# Patient Record
Sex: MALE | Race: WHITE | URBAN - METROPOLITAN AREA
[De-identification: names, ages, dates, MRNs, and addresses within clinical notes are randomized per-mention and may not be internally consistent; named-entity substitution may affect disease eponyms.]

---

## 2018-01-29 ENCOUNTER — OFFICE VISIT (OUTPATIENT)
Dept: URGENT CARE | Facility: RETAIL CLINIC | Age: 28
End: 2018-01-29
Payer: COMMERCIAL

## 2018-01-29 VITALS
DIASTOLIC BLOOD PRESSURE: 82 MMHG | TEMPERATURE: 99.9 F | HEART RATE: 86 BPM | SYSTOLIC BLOOD PRESSURE: 150 MMHG | OXYGEN SATURATION: 99 %

## 2018-01-29 DIAGNOSIS — J45.909 UNCOMPLICATED ASTHMA, UNSPECIFIED ASTHMA SEVERITY, UNSPECIFIED WHETHER PERSISTENT: ICD-10-CM

## 2018-01-29 DIAGNOSIS — J11.1 INFLUENZA: Primary | ICD-10-CM

## 2018-01-29 DIAGNOSIS — J98.01 ACUTE BRONCHOSPASM: ICD-10-CM

## 2018-01-29 PROCEDURE — 99203 OFFICE O/P NEW LOW 30 MIN: CPT | Performed by: PHYSICIAN ASSISTANT

## 2018-01-29 RX ORDER — ALBUTEROL SULFATE 90 UG/1
1-2 AEROSOL, METERED RESPIRATORY (INHALATION) EVERY 4 HOURS PRN
Qty: 1 INHALER | Refills: 0 | Status: SHIPPED | OUTPATIENT
Start: 2018-01-29

## 2018-01-29 RX ORDER — OSELTAMIVIR PHOSPHATE 75 MG/1
75 CAPSULE ORAL 2 TIMES DAILY
Qty: 10 CAPSULE | Refills: 0 | Status: SHIPPED | OUTPATIENT
Start: 2018-01-29 | End: 2018-02-03

## 2018-01-29 NOTE — MR AVS SNAPSHOT
"              After Visit Summary   1/29/2018    Jeffrey Lopez    MRN: [id number]           Patient Information     Date Of Birth          1990        Visit Information        Provider Department      1/29/2018 5:30 PM Cyndi Wheeler PA-C [facility]        Today's Diagnoses     Influenza    -  1    Acute bronchospasm        Uncomplicated asthma, unspecified asthma severity, unspecified whether persistent          Care Instructions    Start Tamiflu capsule (antiviral medicine) 1 capsule twice a day for 5 days with food  Use albuterol inhaler as needed  Remain out of work/activities until 24 HOURS AFTER FEVER/BODY ACHES have resolved.  Keep mask on when around others, even at home.  Maintain hydration by drinking small amounts of clear fluids frequently. Rest. Vaporizer.  Tylenol or ibuprofen as needed for aches and fever   Take over the counter cough suppressant as needed  Call primary care provider if symptoms worsen, high fever, severe weakness or fainting, increased   Go to the ER if any wheezing or shortness of breath develop.     Discussed importance of receiving flu shot yearly          Follow-ups after your visit        Who to contact     You can reach your care team any time of the day by calling [phone].  Notification of test results:  If you have an abnormal lab result, we will notify you by phone as soon as possible.         Additional Information About Your Visit        MyChart Information     [facility] lets you send messages to your doctor, view your test results, renew your prescriptions, schedule appointments and more. To sign up, go to [email or web address] . Click on \"Log in\" on the left side of the screen, which will take you to the Welcome page. Then click on \"Sign up Now\" on the right side of the page.     You will be asked to enter the access code listed below, as well as some personal information. Please follow the directions to create your username " and password.     Your access code is: [email or web address]  Expires: 2018  6:47 PM     Your access code will  in 90 days. If you need help or a new code, please call your [facility] clinic or [phone].        Care EveryWhere ID     This is your Care EveryWhere ID. This could be used by other organizations to access your [facility] medical records  [id number]        Your Vitals Were     Pulse Temperature Pulse Oximetry             86 99.9  F (37.7  C) (Oral) 99%          Blood Pressure from Last 3 Encounters:   18 150/82    Weight from Last 3 Encounters:   No data found for Wt              Today, you had the following     No orders found for display         Today's Medication Changes          These changes are accurate as of 18  6:47 PM.  If you have any questions, ask your nurse or doctor.               Start taking these medicines.        Dose/Directions    albuterol 108 (90 BASE) MCG/ACT Inhaler   Commonly known as:  PROAIR HFA/PROVENTIL HFA/VENTOLIN HFA   Used for:  Acute bronchospasm        Dose:  1-2 puff   Inhale 1-2 puffs into the lungs every 4 hours as needed For wheezing   Quantity:  1 Inhaler   Refills:  0       oseltamivir 75 MG capsule   Commonly known as:  TAMIFLU   Used for:  Influenza, Uncomplicated asthma, unspecified asthma severity, unspecified whether persistent        Dose:  75 mg   Take 1 capsule (75 mg) by mouth 2 times daily for 5 days   Quantity:  10 capsule   Refills:  0            Where to get your medicines      These medications were sent to [id number] - [place], [place] - [address] [address]  [address] [address] [facility] [address]     Phone:  [phone]     albuterol 108 (90 BASE) MCG/ACT Inhaler    oseltamivir 75 MG capsule                Primary Care Provider Fax #    Provider Not In System [phone]                Equal Access to Services     BRIAN CHAU : Lorrie Andrea, to avitia, zenia zelaya  nidhi benitez ah. So [facility] [phone].    ATENCIÓN: Si habla gurmeet, tiene a fermin disposición servicios gratuitos de asistencia lingüística. Lady al [phone].    We comply with applicable federal civil rights laws and [place] laws. We do not discriminate on the basis of race, color, national origin, age, disability, sex, sexual orientation, or gender identity.            Thank you!     Thank you for choosing [facility]  for your care. Our goal is always to provide you with excellent care. Hearing back from our patients is one way we can continue to improve our services. Please take a few minutes to complete the written survey that you may receive in the mail after your visit with us. Thank you!             Your Updated Medication List - Protect others around you: Learn how to safely use, store and throw away your medicines at www.disposemymeds.org.          This list is accurate as of 1/29/18  6:47 PM.  Always use your most recent med list.                   Brand Name Dispense Instructions for use Diagnosis    albuterol 108 (90 BASE) MCG/ACT Inhaler    PROAIR HFA/PROVENTIL HFA/VENTOLIN HFA    1 Inhaler    Inhale 1-2 puffs into the lungs every 4 hours as needed For wheezing    Acute bronchospasm       oseltamivir 75 MG capsule    TAMIFLU    10 capsule    Take 1 capsule (75 mg) by mouth 2 times daily for 5 days    Influenza, Uncomplicated asthma, unspecified asthma severity, unspecified whether persistent

## 2018-01-30 NOTE — NURSING NOTE
Chief Complaint   Patient presents with     Cough     began yesterday morning     Chills     Sweats     Generalized Body Aches     Headache       Initial /82 (BP Location: Left arm)  Pulse 86  Temp 99.9  F (37.7  C) (Oral)  SpO2 99% There is no height or weight on file to calculate BMI.  Medication Reconciliation: complete

## 2018-01-30 NOTE — PATIENT INSTRUCTIONS
Start Tamiflu capsule (antiviral medicine) 1 capsule twice a day for 5 days with food  Use albuterol inhaler as needed  Remain out of work/activities until 24 HOURS AFTER FEVER/BODY ACHES have resolved.  Keep mask on when around others, even at home.  Maintain hydration by drinking small amounts of clear fluids frequently. Rest. Vaporizer.  Tylenol or ibuprofen as needed for aches and fever   Take over the counter cough suppressant as needed  Call primary care provider if symptoms worsen, high fever, severe weakness or fainting, increased   Go to the ER if any wheezing or shortness of breath develop.     Discussed importance of receiving flu shot yearly

## 2018-01-30 NOTE — PROGRESS NOTES
Chief Complaint   Patient presents with     Cough     began yesterday morning     Chills     Sweats     Generalized Body Aches     Headache      SUBJECTIVE:  Patient presents with flu-like symptoms:  Fevers, chills, sweats,, body aches, and cough started yesterday.  Denies dyspnea  Did not get a flu shot  MIld asthma - some wheezing    No past medical history on file.  No current outpatient prescriptions on file.      No Known Allergies     History   Smoking Status     Not on file   Smokeless Tobacco     Not on file       OBJECITVE;  /82 (BP Location: Left arm)  Pulse 86  Temp 99.9  F (37.7  C) (Oral)  SpO2 99%  Appears moderately ill but not toxic.  EARS:  normal.  THROAT AND PHARYNX:  normal.  NECK: supple; no adenopathy in the neck.  SINUSES: non tender.  CHEST:  intermittent wheezing, otherwise no rhonchi or rales    ASSESSMENT:  (J11.1) Influenza , clinically  (J98.01) Acute bronchospasm  (J45.909) Uncomplicated asthma, unspecified asthma severity, unspecified whether persistent    PLAN:  Plan: oseltamivir (TAMIFLU) 75 MG capsule  Plan: albuterol (PROAIR HFA/PROVENTIL HFA/VENTOLIN HFA) 108 (90 BASE) MCG/ACT Inhaler  Start Tamiflu capsule (antiviral medicine) 1 capsule twice a day for 5 days with food  Use albuterol inhaler as needed  Remain out of work/activities until 24 HOURS AFTER FEVER/BODY ACHES have resolved. Offered work note, patient declined  Keep mask on when around others, even at home.  Maintain hydration by drinking small amounts of clear fluids frequently. Rest. Vaporizer.  Tylenol or ibuprofen as needed for aches and fever   Take over the counter cough suppressant as needed  Call primary care provider if symptoms worsen, high fever, severe weakness or fainting, increased   Go to the ER if any wheezing or shortness of breath develop.     Discussed importance of receiving flu shot yearly    Cyndi Wheeler PA-C  [facility]